# Patient Record
Sex: FEMALE | Race: BLACK OR AFRICAN AMERICAN
[De-identification: names, ages, dates, MRNs, and addresses within clinical notes are randomized per-mention and may not be internally consistent; named-entity substitution may affect disease eponyms.]

---

## 2019-06-16 ENCOUNTER — HOSPITAL ENCOUNTER (EMERGENCY)
Dept: HOSPITAL 96 - M.ERS | Age: 44
Discharge: HOME | End: 2019-06-16
Payer: COMMERCIAL

## 2019-06-16 VITALS — BODY MASS INDEX: 42.33 KG/M2 | WEIGHT: 230.01 LBS | HEIGHT: 62 IN

## 2019-06-16 VITALS — DIASTOLIC BLOOD PRESSURE: 72 MMHG | SYSTOLIC BLOOD PRESSURE: 103 MMHG

## 2019-06-16 DIAGNOSIS — Z98.890: ICD-10-CM

## 2019-06-16 DIAGNOSIS — M25.571: Primary | ICD-10-CM

## 2019-06-16 DIAGNOSIS — Z90.710: ICD-10-CM

## 2020-11-12 ENCOUNTER — HOSPITAL ENCOUNTER (EMERGENCY)
Dept: HOSPITAL 35 - ER | Age: 45
Discharge: HOME | End: 2020-11-12
Payer: COMMERCIAL

## 2020-11-12 VITALS — HEIGHT: 70 IN | WEIGHT: 230.01 LBS | BODY MASS INDEX: 32.93 KG/M2

## 2020-11-12 VITALS — SYSTOLIC BLOOD PRESSURE: 127 MMHG | DIASTOLIC BLOOD PRESSURE: 73 MMHG

## 2020-11-12 DIAGNOSIS — Z79.899: ICD-10-CM

## 2020-11-12 DIAGNOSIS — Z98.890: ICD-10-CM

## 2020-11-12 DIAGNOSIS — R07.89: Primary | ICD-10-CM

## 2020-11-12 DIAGNOSIS — I10: ICD-10-CM

## 2020-11-12 DIAGNOSIS — R20.0: ICD-10-CM

## 2020-11-12 DIAGNOSIS — Z88.8: ICD-10-CM

## 2020-11-12 DIAGNOSIS — Z90.711: ICD-10-CM

## 2020-11-12 LAB
ANION GAP SERPL CALC-SCNC: 7 MMOL/L (ref 7–16)
BASOPHILS NFR BLD AUTO: 1 % (ref 0–2)
BUN SERPL-MCNC: 14 MG/DL (ref 7–18)
CALCIUM SERPL-MCNC: 9 MG/DL (ref 8.5–10.1)
CHLORIDE SERPL-SCNC: 103 MMOL/L (ref 98–107)
CO2 SERPL-SCNC: 28 MMOL/L (ref 21–32)
CREAT SERPL-MCNC: 0.9 MG/DL (ref 0.6–1)
EOSINOPHIL NFR BLD: 1 % (ref 0–3)
ERYTHROCYTE [DISTWIDTH] IN BLOOD BY AUTOMATED COUNT: 13.8 % (ref 10.5–14.5)
GLUCOSE SERPL-MCNC: 107 MG/DL (ref 74–106)
GRANULOCYTES NFR BLD MANUAL: 38 % (ref 36–66)
HCT VFR BLD CALC: 43.4 % (ref 37–47)
HGB BLD-MCNC: 14.2 GM/DL (ref 12–15)
LYMPHOCYTES NFR BLD AUTO: 55 % (ref 24–44)
MCH RBC QN AUTO: 29.3 PG (ref 26–34)
MCHC RBC AUTO-ENTMCNC: 32.8 G/DL (ref 28–37)
MCV RBC: 89.3 FL (ref 80–100)
MONOCYTES NFR BLD: 5 % (ref 1–8)
NEUTROPHILS # BLD: 3.4 THOU/UL (ref 1.4–8.2)
PLATELET # BLD EST: NORMAL 10*3/UL
PLATELET # BLD: 255 THOU/UL (ref 150–400)
POTASSIUM SERPL-SCNC: 4 MMOL/L (ref 3.5–5.1)
RBC # BLD AUTO: 4.86 MIL/UL (ref 4.2–5)
RBC MORPH BLD: NORMAL
SODIUM SERPL-SCNC: 138 MMOL/L (ref 136–145)
TROPONIN I SERPL-MCNC: <0.06 NG/ML (ref ?–0.06)
WBC # BLD AUTO: 8.9 THOU/UL (ref 4–11)

## 2020-11-13 NOTE — EKG
[facility]
[place]   [address]                     ELECTROCARDIOGRAM REPORT      
_______________________________________________________________________________
 
Name:       HEATH ROGERS               Room #:                     [facility]#:      [id number]                       Account #:      [id number]  
Admission:  20    Attend Phys:                          
Discharge:  20    Date of Birth:  [birth year]  
                                                          Report #: [id number]
                                                                    [id number]
_______________________________________________________________________________
THIS REPORT FOR:  
 
cc:  Eliazar Juarez MD, Gregg R. DO Santiago, Patrick MD [facility]                                         ~
THIS REPORT FOR:   //name//                          
 
                         [facility] ED
                                       
Test Date:    2020               Test Time:    20:50:19
Pat Name:     HEATH ROGERS            Department:   
Patient ID:   [id number]            Room:          
Gender:       F                        Technician:   [facility]
:          1975               Requested By: Chato Newman
Order Number: [id number] MD:   Man Monroy
                                 Measurements
Intervals                              Axis          
Rate:         102                      P:            39
CA:           171                      QRS:          43
QRSD:         98                       T:            -37
QT:           346                                    
QTc:          451                                    
                           Interpretive Statements
Sinus tachycardia
Borderline T abnormalities, inferior leads
No previous ECG available for comparison
Electronically Signed On 2020 7:40:51 CST by Man Monroy
[email or web address]
 
 
 
 
 
 
 
 
 
 
 
 
 
 
 
 
  <ELECTRONICALLY SIGNED>
   By: Man Monroy MD, FACC    
  11/13740
D: 20                           _____________________________________
T: 20                           Man Monroy MD, [facility]      /EPI